# Patient Record
(demographics unavailable — no encounter records)

---

## 2024-11-13 NOTE — HISTORY OF PRESENT ILLNESS
[FreeTextEntry1] : Pt presenting today for a f.u visit for OP.  Last DEXA 10/2024: Spine: T-score: -2.7; previously -3. 1,Z-score: -0.3, BMD: 0.753 g/cm2 Femoral neck: T-score: -2.2, Z-score:  -0.2, BMD:  0.609 g/cm2 Total hip: T-score: -2.1 ; previously -1.7, Z-score:  -0.3, BMD:  0.688 g/cm2 IMPRESSION: Osteoporosis. On treatment with alendronate. Now experiencing GERD and unable to tolerate Had extensive discussion with pt today about treatment options. At this time the decision was made to start treatment with IV Reclast pending lab results.  ROS Otherwise unchanged from LV.

## 2024-11-13 NOTE — ASSESSMENT
[FreeTextEntry1] : OP, postmenopausal   Last DEXA: 10/2024: Spine: T-score: -2.7; previously -3.1, Z-score: -0.3, BMD: 0.753 g/cm2 Femoral neck: T-score: -2.2 Z-score:  -0.2 BMD:  0.609 g/cm2 Total hip: T-score: -2.1 ; previously -1.7  Z-score:  -0.3 BMD:  0.688 g/cm2 IMPRESSION: Osteoporosis  - labs as below. will call pt with results - Previously on treatment with alendronate. Now experiencing GERD and unable to tolerate - Had extensive discussion with pt today about treatment options. At this time the decision was made to start treatment with IV Reclast pending lab results.  - calcium and vitamin d supplementation - regular exercise: aerobic and resistance - fall prevention  Discussed treatment plan with the pt and her grand son. The patient was given the opportunity to ask questions and all questions were answered to their satisfaction.

## 2025-06-02 NOTE — HISTORY OF PRESENT ILLNESS
[FreeTextEntry8] : Joint Pain  [Family Member] : family member [FreeTextEntry1] : joint pains [de-identified] : no chest pain, no sob, no cough, no fever, no dizziness, no abdominal pain, no n/v/d/c/melena/brbpr/hematuria/dysuria here with daughter in law  admits to worsening joint pains

## 2025-06-02 NOTE — HISTORY OF PRESENT ILLNESS
[FreeTextEntry8] : Joint Pain  [Family Member] : family member [FreeTextEntry1] : joint pains [de-identified] : no chest pain, no sob, no cough, no fever, no dizziness, no abdominal pain, no n/v/d/c/melena/brbpr/hematuria/dysuria here with daughter in law  admits to worsening joint pains

## 2025-06-02 NOTE — ASSESSMENT
[FreeTextEntry1] :  followup labs  get mammo  refused colon screen at this time - states has cologuard at home if she changes her mind  see gyn for annual  continue with rheumatology